# Patient Record
Sex: FEMALE | Race: AMERICAN INDIAN OR ALASKA NATIVE | URBAN - METROPOLITAN AREA
[De-identification: names, ages, dates, MRNs, and addresses within clinical notes are randomized per-mention and may not be internally consistent; named-entity substitution may affect disease eponyms.]

---

## 2022-02-22 ENCOUNTER — OFFICE VISIT (OUTPATIENT)
Dept: URBAN - METROPOLITAN AREA CLINIC 64 | Facility: CLINIC | Age: 47
End: 2022-02-22
Payer: OTHER GOVERNMENT

## 2022-02-22 DIAGNOSIS — H53.8 OTHER VISUAL DISTURBANCES: Primary | ICD-10-CM

## 2022-02-22 DIAGNOSIS — R42 DIZZINESS: ICD-10-CM

## 2022-02-22 PROCEDURE — 92004 COMPRE OPH EXAM NEW PT 1/>: CPT | Performed by: OPTOMETRIST

## 2022-02-22 ASSESSMENT — KERATOMETRY
OS: 40.32
OD: 40.56

## 2022-02-22 ASSESSMENT — INTRAOCULAR PRESSURE
OS: 15
OD: 12

## 2022-02-22 ASSESSMENT — VISUAL ACUITY
OS: 20/20
OD: 20/20

## 2022-02-22 NOTE — IMPRESSION/PLAN
Impression: Dizziness: R42. Plan: Referred by her PCP for dizziness that has improved over the last 6 months. Normal ocular health exam.  Pt. ed. on findings.

## 2022-02-22 NOTE — IMPRESSION/PLAN
Impression: Other visual disturbances: H53.8. Plan: Blurry vision is from uncorrected refractive error. Normal ocular health. Pt. ed. on findings.   She declines glasses Rx today due to cost.

## 2024-04-09 ENCOUNTER — OFFICE VISIT (OUTPATIENT)
Dept: URBAN - METROPOLITAN AREA CLINIC 64 | Facility: LOCATION | Age: 49
End: 2024-04-09
Payer: OTHER GOVERNMENT

## 2024-04-09 DIAGNOSIS — H52.13 MYOPIA, BILATERAL: ICD-10-CM

## 2024-04-09 DIAGNOSIS — H11.153 PINGUECULA, BILATERAL: ICD-10-CM

## 2024-04-09 DIAGNOSIS — H43.393 OTHER VITREOUS OPACITIES, BILATERAL: Primary | ICD-10-CM

## 2024-04-09 DIAGNOSIS — H16.223 KERATOCONJUNCTIVITIS SICCA, BILATERAL: ICD-10-CM

## 2024-04-09 PROCEDURE — 99214 OFFICE O/P EST MOD 30 MIN: CPT

## 2024-04-09 ASSESSMENT — INTRAOCULAR PRESSURE
OS: 14
OD: 16

## 2024-04-09 ASSESSMENT — VISUAL ACUITY
OD: 20/20
OS: 20/20

## 2024-04-09 ASSESSMENT — KERATOMETRY
OD: 40.54
OS: 22.27